# Patient Record
Sex: MALE | Race: WHITE | NOT HISPANIC OR LATINO | Employment: FULL TIME | URBAN - METROPOLITAN AREA
[De-identification: names, ages, dates, MRNs, and addresses within clinical notes are randomized per-mention and may not be internally consistent; named-entity substitution may affect disease eponyms.]

---

## 2018-08-31 ENCOUNTER — RECORDS - HEALTHEAST (OUTPATIENT)
Dept: LAB | Facility: CLINIC | Age: 53
End: 2018-08-31

## 2018-08-31 LAB
ALBUMIN SERPL-MCNC: 4.2 G/DL (ref 3.5–5)
ALP SERPL-CCNC: 94 U/L (ref 45–120)
ALT SERPL W P-5'-P-CCNC: 28 U/L (ref 0–45)
ANION GAP SERPL CALCULATED.3IONS-SCNC: 8 MMOL/L (ref 5–18)
AST SERPL W P-5'-P-CCNC: 18 U/L (ref 0–40)
BILIRUB SERPL-MCNC: 0.8 MG/DL (ref 0–1)
BUN SERPL-MCNC: 15 MG/DL (ref 8–22)
CALCIUM SERPL-MCNC: 9.5 MG/DL (ref 8.5–10.5)
CHLORIDE BLD-SCNC: 107 MMOL/L (ref 98–107)
CO2 SERPL-SCNC: 26 MMOL/L (ref 22–31)
CREAT SERPL-MCNC: 1.07 MG/DL (ref 0.7–1.3)
GFR SERPL CREATININE-BSD FRML MDRD: >60 ML/MIN/1.73M2
GLUCOSE BLD-MCNC: 103 MG/DL (ref 70–125)
LITHIUM SERPL-SCNC: 0.5 MMOL/L (ref 0.6–1.2)
POTASSIUM BLD-SCNC: 4.4 MMOL/L (ref 3.5–5)
PROT SERPL-MCNC: 7 G/DL (ref 6–8)
SODIUM SERPL-SCNC: 141 MMOL/L (ref 136–145)
TSH SERPL DL<=0.005 MIU/L-ACNC: 1.89 UIU/ML (ref 0.3–5)

## 2019-06-21 ENCOUNTER — RESULTS ONLY (OUTPATIENT)
Dept: LAB | Age: 54
End: 2019-06-21

## 2019-06-24 LAB
APTT PPP: 30 SEC (ref 22–37)
INR PPP: 1.11 (ref 0.86–1.14)
PLATELET # BLD AUTO: 211 10E9/L (ref 150–450)

## 2023-04-14 ENCOUNTER — ANCILLARY PROCEDURE (OUTPATIENT)
Dept: GENERAL RADIOLOGY | Facility: CLINIC | Age: 58
End: 2023-04-14
Attending: FAMILY MEDICINE
Payer: COMMERCIAL

## 2023-04-14 ENCOUNTER — OFFICE VISIT (OUTPATIENT)
Dept: FAMILY MEDICINE | Facility: CLINIC | Age: 58
End: 2023-04-14
Payer: COMMERCIAL

## 2023-04-14 VITALS
HEART RATE: 104 BPM | SYSTOLIC BLOOD PRESSURE: 153 MMHG | TEMPERATURE: 98.6 F | RESPIRATION RATE: 18 BRPM | OXYGEN SATURATION: 97 % | WEIGHT: 198.6 LBS | DIASTOLIC BLOOD PRESSURE: 96 MMHG

## 2023-04-14 DIAGNOSIS — R05.1 ACUTE COUGH: ICD-10-CM

## 2023-04-14 DIAGNOSIS — J18.9 PNEUMONIA OF RIGHT MIDDLE LOBE DUE TO INFECTIOUS ORGANISM: Primary | ICD-10-CM

## 2023-04-14 DIAGNOSIS — R06.2 WHEEZING: ICD-10-CM

## 2023-04-14 PROCEDURE — 94640 AIRWAY INHALATION TREATMENT: CPT | Performed by: FAMILY MEDICINE

## 2023-04-14 PROCEDURE — 71046 X-RAY EXAM CHEST 2 VIEWS: CPT | Mod: TC | Performed by: RADIOLOGY

## 2023-04-14 PROCEDURE — 99204 OFFICE O/P NEW MOD 45 MIN: CPT | Mod: 25 | Performed by: FAMILY MEDICINE

## 2023-04-14 RX ORDER — CODEINE PHOSPHATE AND GUAIFENESIN 10; 100 MG/5ML; MG/5ML
1-2 SOLUTION ORAL EVERY 6 HOURS PRN
Qty: 120 ML | Refills: 0 | Status: SHIPPED | OUTPATIENT
Start: 2023-04-14

## 2023-04-14 RX ORDER — PREDNISONE 20 MG/1
TABLET ORAL
Qty: 15 TABLET | Refills: 0 | Status: SHIPPED | OUTPATIENT
Start: 2023-04-14

## 2023-04-14 RX ORDER — LAMOTRIGINE 100 MG/1
TABLET ORAL
COMMUNITY
Start: 2023-02-11

## 2023-04-14 RX ORDER — AZITHROMYCIN 250 MG/1
TABLET, FILM COATED ORAL
Qty: 6 TABLET | Refills: 0 | Status: SHIPPED | OUTPATIENT
Start: 2023-04-14 | End: 2023-04-19

## 2023-04-14 RX ORDER — ALBUTEROL SULFATE 90 UG/1
2 AEROSOL, METERED RESPIRATORY (INHALATION) EVERY 6 HOURS PRN
Qty: 18 G | Refills: 0 | Status: SHIPPED | OUTPATIENT
Start: 2023-04-14

## 2023-04-14 RX ORDER — ALBUTEROL SULFATE 0.83 MG/ML
2.5 SOLUTION RESPIRATORY (INHALATION) ONCE
Status: COMPLETED | OUTPATIENT
Start: 2023-04-14 | End: 2023-04-14

## 2023-04-14 RX ADMIN — ALBUTEROL SULFATE 2.5 MG: 0.83 SOLUTION RESPIRATORY (INHALATION) at 20:12

## 2023-04-15 NOTE — PROGRESS NOTES
Assessment/Plan:   Acute cough  Wheezing  Pneumonia of right middle lobe due to infectious organism  Persistent cough for 3-4 weeks following Covid-19 infection. Worse for 4-5 days, productive vigorous cough with wheeze, chest tightness and shortness of breath. Today felt feverish. Continuous cough on exam with wheeze and rales.   Albuterol neb was given in the office. Repeat auscultation afterwards demonstrated improved air movement, no wheezes and able to take full big breaths.   CXR obtained and viewed by me showed infiltrates on the right side. Suspect pneumonia.   We will treat with dual antibiotics azithromycin and augmentin.   Albuterol inhaler with spacer prescribed 2 puffs every 4 hours as needed for wheeze, shortness of breath and cough.   If not improving may start the prednisone   Robitussin with codeine for severe cough at night.   Recheck if not improving in a couple days, sooner if worse.    - XR Chest 2 Views; Future  - albuterol (PROVENTIL) neb solution 2.5 mg - administered in the office  - guaiFENesin-codeine (ROBITUSSIN AC) 100-10 MG/5ML solution; Take 5-10 mLs by mouth every 6 hours as needed for cough  Dispense: 120 mL; Refill: 0  - Miscellaneous Order for DME - ONLY FOR DME  - albuterol (PROAIR HFA/PROVENTIL HFA/VENTOLIN HFA) 108 (90 Base) MCG/ACT inhaler; Inhale 2 puffs into the lungs every 6 hours as needed for shortness of breath, wheezing or cough  Dispense: 18 g; Refill: 0  - predniSONE (DELTASONE) 20 MG tablet; 40mg daily for 5 days then 20mg daily for 5 days  Dispense: 15 tablet; Refill: 0  - azithromycin (ZITHROMAX) 250 MG tablet; Take 2 tablets (500 mg) by mouth daily for 1 day, THEN 1 tablet (250 mg) daily for 4 days.  Dispense: 6 tablet; Refill: 0  - amoxicillin-clavulanate (AUGMENTIN) 875-125 MG tablet; Take 1 tablet by mouth 2 times daily for 10 days  Dispense: 20 tablet; Refill: 0    I discussed red flag symptoms, return precautions to clinic/ER and follow up care with  patient/parent.  Expected clinical course, symptomatic cares advised. Questions answered. Patient/parent amenable with plan.    Augmentin twice a day for 10 days  Take with food  Yogurt or probiotics    Azithromycin as directed    Albuterol inhaler with spacer, 2 puffs every 4 hours as needed for wheeze or shortness of breath.     Prednisone - start if persistent cough or wheeze    Codeine cough syrup at bedtime (and every 6 hours) if needed for severe cough.     Recheck if worse or no better.     Subjective:     Ferdinand Lynn is a 57 year old male who presents for evaluation of persistent and worsening cough. He had Covid in mid-March, 3/17/23. Symptoms included nasal congestion, fatigue and cough. He felt better after a few days except for the cough which has been persistent.   The last 4-5 days the cough has been worse - productive and more vigorous, continuous.   This week at work as a  he could hardly talk with patients due to coughing.   He has had some chest tightness and shortness of breath.  Today he felt feverish.   No chest pain except substernal. No leg swelling or calf tenderness.   No N/V/D, no rash, no ear pain or ST or sinus pressure.   He had childhood asthma which he outgrew.   Nonsmoker     No Known Allergies     Current Outpatient Medications   Medication     albuterol (PROAIR HFA/PROVENTIL HFA/VENTOLIN HFA) 108 (90 Base) MCG/ACT inhaler     amoxicillin-clavulanate (AUGMENTIN) 875-125 MG tablet     azithromycin (ZITHROMAX) 250 MG tablet     guaiFENesin-codeine (ROBITUSSIN AC) 100-10 MG/5ML solution     lamoTRIgine (LAMICTAL) 100 MG tablet     predniSONE (DELTASONE) 20 MG tablet     No current facility-administered medications for this visit.     Patient Active Problem List   Diagnosis   (none) - all problems resolved or deleted       Objective:     BP (!) 153/96   Pulse 104   Temp 98.6  F (37  C) (Oral)   Resp 18   Wt 90.1 kg (198 lb 9.6 oz)   SpO2 97%      Physical    General Appearance: Alert, pleasant, no distress but low energy and mildly ill appearing, aVSS. Frequent coughing, BP and pulse slightly elevated.   Head: Normocephalic, without obvious abnormality, atraumatic  Eyes: Conjunctivae are normal.   Ears: Normal TMs and external ear canals, both ears  Nose: mild congestion.  Throat: Throat is normal.  No exudate.  No vesicular lesions  Neck: No adenopathy  Lungs: very tight chest with shallow breaths to avoid cough, expiratory wheezes, few crackles both sides, respirations unlabored  Heart: Regular rate and rhythm  Extremities: No lower extremity edema  Skin: Skin color, texture, turgor normal, no rashes or lesions  Psychiatric: Patient has a normal mood and affect.         Results for orders placed or performed in visit on 04/14/23   XR Chest 2 Views     Status: None    Narrative    EXAM: XR CHEST 2 VIEWS  LOCATION: [facility]  DATE/TIME: 4/14/2023 8:29 PM CDT    INDICATION: worsening cough and wheeze after covid infection 4 weeks ago  COMPARISON: None.      Impression    IMPRESSION: There is localized infiltrate seen in the right midlung worrisome for pneumonitis given patient's history of cough. There is mild increased perihilar lung markings most typical for prior bronchial inflammation. Prominent nipple shadows.       This note has been dictated in part using voice recognition software.  Any grammatical or context distortions are unintentional and inherent to the software.  Please feel free to contact me directly for clarification if needed.

## 2023-04-15 NOTE — PATIENT INSTRUCTIONS
Augmentin twice a day for 10 days  Take with food  Yogurt or probiotics    Azithromycin as directed    Albuterol inhaler with spacer, 2 puffs every 4 hours as needed for wheeze or shortness of breath.     Prednisone - start if persistent cough or wheeze    Codeine cough syrup at bedtime (and every 6 hours) if needed for severe cough.     Recheck if worse or no better.

## 2023-06-01 ENCOUNTER — HEALTH MAINTENANCE LETTER (OUTPATIENT)
Age: 58
End: 2023-06-01

## 2024-04-13 ENCOUNTER — ANCILLARY PROCEDURE (OUTPATIENT)
Dept: GENERAL RADIOLOGY | Facility: CLINIC | Age: 59
End: 2024-04-13
Attending: FAMILY MEDICINE
Payer: COMMERCIAL

## 2024-04-13 ENCOUNTER — OFFICE VISIT (OUTPATIENT)
Dept: URGENT CARE | Facility: URGENT CARE | Age: 59
End: 2024-04-13
Payer: COMMERCIAL

## 2024-04-13 VITALS
RESPIRATION RATE: 19 BRPM | SYSTOLIC BLOOD PRESSURE: 112 MMHG | HEART RATE: 92 BPM | WEIGHT: 200 LBS | TEMPERATURE: 97.2 F | OXYGEN SATURATION: 95 % | DIASTOLIC BLOOD PRESSURE: 68 MMHG

## 2024-04-13 DIAGNOSIS — J06.9 VIRAL URI WITH COUGH: Primary | ICD-10-CM

## 2024-04-13 LAB
FLUAV AG SPEC QL IA: NEGATIVE
FLUBV AG SPEC QL IA: NEGATIVE

## 2024-04-13 PROCEDURE — 71046 X-RAY EXAM CHEST 2 VIEWS: CPT | Mod: TC | Performed by: RADIOLOGY

## 2024-04-13 PROCEDURE — 99213 OFFICE O/P EST LOW 20 MIN: CPT | Performed by: FAMILY MEDICINE

## 2024-04-13 PROCEDURE — 87804 INFLUENZA ASSAY W/OPTIC: CPT | Performed by: FAMILY MEDICINE

## 2024-04-13 ASSESSMENT — PAIN SCALES - GENERAL: PAINLEVEL: NO PAIN (0)

## 2024-04-13 NOTE — PROGRESS NOTES
Subjective: Patient had pneumonia a year ago after COVID.  This child got a cold and then 5 days ago he caught the cold, might of had a fever at the beginning along with the cough and some chills but today the cough got a lot worse and breathing is a little harder.  There is nasal congestion.  He also for couple of weeks before getting this cold out a little persistent left-sided chest pain although it would come and go unrelated to activity or breathing or movement and it would last a day or 2 and then go away for a while and then come back and now since he gets a cold it is gone.  Definitely not exertion related.  Objective: ENT is nonspecific.  Neck is normal.  Lungs with bibasilar crackles.  Heart is regular without murmurs.  Oxygenation is 95% on room air.  Chest x-ray was done.  I would consider it normal.  We are getting a stat reading.    Assessment and plan: Viral URI.  Seems a little worse today but may be just because he was a lot more active.  He will do another COVID test, did 1 at home earlier, just to be sure.  I will let him know if the radiologist sees anything different.

## 2024-06-09 ENCOUNTER — HEALTH MAINTENANCE LETTER (OUTPATIENT)
Age: 59
End: 2024-06-09

## 2024-11-05 ENCOUNTER — OFFICE VISIT (OUTPATIENT)
Dept: URGENT CARE | Facility: URGENT CARE | Age: 59
End: 2024-11-05
Payer: COMMERCIAL

## 2024-11-05 VITALS
RESPIRATION RATE: 18 BRPM | TEMPERATURE: 97.6 F | OXYGEN SATURATION: 97 % | DIASTOLIC BLOOD PRESSURE: 83 MMHG | HEART RATE: 67 BPM | SYSTOLIC BLOOD PRESSURE: 157 MMHG

## 2024-11-05 DIAGNOSIS — M54.50 LUMBOSACRAL PAIN: Primary | ICD-10-CM

## 2024-11-05 PROCEDURE — 99213 OFFICE O/P EST LOW 20 MIN: CPT | Mod: 25 | Performed by: PHYSICIAN ASSISTANT

## 2024-11-05 PROCEDURE — 96372 THER/PROPH/DIAG INJ SC/IM: CPT | Performed by: PHYSICIAN ASSISTANT

## 2024-11-05 RX ORDER — MELOXICAM 15 MG/1
15 TABLET ORAL DAILY
Qty: 14 TABLET | Refills: 0 | Status: SHIPPED | OUTPATIENT
Start: 2024-11-05 | End: 2024-11-19

## 2024-11-05 RX ORDER — KETOROLAC TROMETHAMINE 30 MG/ML
15 INJECTION, SOLUTION INTRAMUSCULAR; INTRAVENOUS ONCE
Status: COMPLETED | OUTPATIENT
Start: 2024-11-05 | End: 2024-11-05

## 2024-11-05 RX ORDER — CYCLOBENZAPRINE HCL 10 MG
10 TABLET ORAL
Qty: 14 TABLET | Refills: 0 | Status: SHIPPED | OUTPATIENT
Start: 2024-11-05 | End: 2024-11-19

## 2024-11-05 RX ADMIN — KETOROLAC TROMETHAMINE 15 MG: 30 INJECTION, SOLUTION INTRAMUSCULAR; INTRAVENOUS at 14:00

## 2024-11-05 NOTE — PATIENT INSTRUCTIONS
Your back pain from left sciatica.     I have prescribed you a short course of antiinflammatory to help decrease the inflammation in the area of the spasm. This should help to decrease associated numbness/tingling (nerve pain). Please take as directed.    Do not take OTC ibuprofen or NSAIDs while on the Mobic. If having pain, take Tylenol up to 1,000mg, 4 times daily.    You may use the Flexeril as needed for muscle spasm. Use caution while taking this medication, as it can make you drowsy. Do not take while driving, operating heavy machinery, or doing any activities requiring intense concentration.    Try using a heating pad and/or warm baths.    Make sure to keep moving to avoid getting stiff. See below for stretching exercises.    If you develop fever, severe pain that prevents you from walking at all, weakness of your arms or legs, loss of bowel or bladder continence, or any other new concerning symptoms, go to the ER immediately.    Otherwise, follow up with primary care doctor as needed or if no improvement in pain in symptoms in 1 week.

## 2024-11-05 NOTE — PROGRESS NOTES
Patient presents with:  Back Pain: Has lower back pain since yesterday has hx of back pain      Clinical Decision Making:  Patient is having difficulty with right sided lumbar sacral pain.  He is given Toradol 15 mg in the office single dose for anti-inflammatory effect.  Will transition to Mobic once daily for analgesia and anti-inflammatory.  Use of cyclobenzaprine at nighttime for sleep and comfort.  Patient is cautioned regarding impairment and sedation. Expected course of resolution and indication for return was gone over and questions were answered to patient/parent's satisfaction before discharge.        ICD-10-CM    1. Lumbosacral pain  M54.50 ketorolac (TORADOL) injection 15 mg     meloxicam (MOBIC) 15 MG tablet     cyclobenzaprine (FLEXERIL) 10 MG tablet          Patient Instructions   Your back pain from left sciatica.     I have prescribed you a short course of antiinflammatory to help decrease the inflammation in the area of the spasm. This should help to decrease associated numbness/tingling (nerve pain). Please take as directed.    Do not take OTC ibuprofen or NSAIDs while on the Mobic. If having pain, take Tylenol up to 1,000mg, 4 times daily.    You may use the Flexeril as needed for muscle spasm. Use caution while taking this medication, as it can make you drowsy. Do not take while driving, operating heavy machinery, or doing any activities requiring intense concentration.    Try using a heating pad and/or warm baths.    Make sure to keep moving to avoid getting stiff. See below for stretching exercises.    If you develop fever, severe pain that prevents you from walking at all, weakness of your arms or legs, loss of bowel or bladder continence, or any other new concerning symptoms, go to the ER immediately.    Otherwise, follow up with primary care doctor as needed or if no improvement in pain in symptoms in 1 week.            HPI:  Ferdinand Lynn is a 59 year old male who presents today  for a one day acute onset of right sided lower lumbar sacral pain at the right SI joint.  Patient states the pain is a 9 out of 10.  It radiates to the buttock but does not go any farther into the thigh or leg.  Does not have any lower extremity weakness or paresthesias.  No reported fever fecal or urinary incontinence.  Has not tried treatment for this at home.  Has not had prior history of surgeries, fractures but has had self-limited low back pain in the past.    History obtained from chart review and the patient.    Problem List:  2006-04: iamJOINT PAIN-ANKLE      No past medical history on file.    Social History     Tobacco Use    Smoking status: Never    Smokeless tobacco: Never   Substance Use Topics    Alcohol use: Not on file       Review of Systems  As above in HPI otherwise negative.    Vitals:    11/05/24 1327   BP: (!) 157/83   Pulse: 67   Resp: 18   Temp: 97.6  F (36.4  C)   TempSrc: Tympanic   SpO2: 97%     General: Patient is slightly uncomfortable in the office encounter secondary to pain.  Patient ambulates into the office encounter is able to walk into the office encounter sit in the chair stand up and walk to the examination table.    Musculoskeletal:  No noted deformities and the alignment is midline.    Palpation: No cervical thoracic lumbar sacral spinous process tenderness to palpation  Paraspinal muscles are palpated.    There is slight paraspinal muscle tenderness on the right side.  SI joint on the right side is tender to palpation and does reproduce symptoms radiates into the buttock  but no farther into the lower leg.    Range of motion: She has extension to 20 degrees and flexion to 90 degrees although touching toes with flexion is painful for the patient and does reproduce symptoms    Reflexes: DTRs are 2 out of 4 and equal bilaterally at the Achilles and patella.    Musculoskeletal strength: 5 out of 5 and equal bilaterally.  Straight leg raises reproduce pain on the right  side  Seated straight leg raises produce a flip sign on the left and right side  Patient is able to arise on the heels with pain in the right SI joint and buttock but balls of the feet are non-painful.    Physical Exam    At the end of the encounter, I discussed results, diagnosis, medications. Discussed red flags for immediate return to clinic/ER, as well as indications for follow up if no improvement. Patient understood and agreed to plan. Patient was stable for discharge.

## 2024-11-18 ENCOUNTER — OFFICE VISIT (OUTPATIENT)
Dept: URGENT CARE | Facility: URGENT CARE | Age: 59
End: 2024-11-18
Payer: COMMERCIAL

## 2024-11-18 VITALS
DIASTOLIC BLOOD PRESSURE: 84 MMHG | SYSTOLIC BLOOD PRESSURE: 158 MMHG | OXYGEN SATURATION: 94 % | HEART RATE: 82 BPM | TEMPERATURE: 98.2 F | RESPIRATION RATE: 18 BRPM

## 2024-11-18 DIAGNOSIS — R07.0 THROAT PAIN: Primary | ICD-10-CM

## 2024-11-18 LAB
DEPRECATED S PYO AG THROAT QL EIA: NEGATIVE
GROUP A STREP BY PCR: NOT DETECTED

## 2024-11-18 PROCEDURE — 99213 OFFICE O/P EST LOW 20 MIN: CPT | Performed by: PHYSICIAN ASSISTANT

## 2024-11-18 PROCEDURE — 87651 STREP A DNA AMP PROBE: CPT | Performed by: PHYSICIAN ASSISTANT

## 2024-11-18 PROCEDURE — 87635 SARS-COV-2 COVID-19 AMP PRB: CPT | Performed by: PHYSICIAN ASSISTANT

## 2024-11-18 ASSESSMENT — ENCOUNTER SYMPTOMS
ABDOMINAL PAIN: 0
FEVER: 0
SORE THROAT: 1
FATIGUE: 1
NAUSEA: 0
VOMITING: 0
COUGH: 1

## 2024-11-18 NOTE — PATIENT INSTRUCTIONS
Rapid strep test was negative today.  We will be doing a confirmatory strep test that takes between 12 and 24 hours to come back.  We only call you with this test result if it is positive.  It will be visible in MyChart.  Your COVID test will also be visible in MyChart in 12 to 24 hours.  At this time your lungs are sounding clear, but if you are not having any improvement in your cough over the next 3 days or if you are developing fevers please follow up for chest x-ray due to high rates of mycoplasma pneumonia in our community at this time.  For right now I recommend alternating between DayQuil and NyQuil.  You may also take ibuprofen and Tylenol(Acetaminophen) for pain management.  There is acetaminophen in Dayquil and NyQuil, so if you are taking both make sure that you are not exceeding 4000 mg of Acetaminophen in a 24 hour period.   Drink plenty of fluids and rest your voice and body.  Lozenges and salt water gargles may be helpful for both your throat pain and cough relief as well.  A teaspoon of honey is a natural cough suppressant also.

## 2024-11-18 NOTE — PROGRESS NOTES
Patient presents with:  Pharyngitis: Sore throat cough and fatigue for about 2 days       Clinical Decision Making:  Sick symptoms for the past 2 days.  Rapid strep test negative.  Physical exam generally benign.  Lungs are currently CTA, but pneumonia does remain on differential due to high rates in the community.  COVID test in process.  Recommend supportive cares for now.  Patient is agreeable to this plan.      ICD-10-CM    1. Throat pain  R07.0 Streptococcus A Rapid Screen w/Reflex to PCR - Clinic Collect     Group A Streptococcus PCR Throat Swab     COVID-19 Virus (Coronavirus) by PCR Nose          Patient Instructions   Rapid strep test was negative today.  We will be doing a confirmatory strep test that takes between 12 and 24 hours to come back.  We only call you with this test result if it is positive.  It will be visible in MyChart.  Your COVID test will also be visible in MyChart in 12 to 24 hours.  At this time your lungs are sounding clear, but if you are not having any improvement in your cough over the next 3 days or if you are developing fevers that like to have you following up for chest x-ray due to high rates of mycoplasma pneumonia in our community at this time.  For right now I recommend alternating between DayQuil and NyQuil.  You may also take ibuprofen and Tylenol(Acetaminophen) for pain management.  There is acetaminophen in day and NyQuil, so if you are taking both make sure that you are not exceeding 4000 mg of acetaminophen in a 24 hour period.   Drink plenty of fluids and rest your voice and body.  Lozenges and salt water gargles may be helpful for both your throat pain and cough relief as well.  A teaspoon of honey is a natural cough suppressant also.    HPI:  Ferdinand Lynn is a 59 year old male who presents today with concerns of ST and fatigue x 2 days. NO KNOWN SICK CONTACTS. Patient had been seen in the ED last week for his back and was started of Prednisone burst. He  has taken Nyquil and Dayquil. Patient works as a  at [employer]. No at home COVID test.     History obtained from the patient.    Problem List:  2006-04: iamJOINT PAIN-ANKLE      No past medical history on file.    Social History     Tobacco Use    Smoking status: Never    Smokeless tobacco: Never   Substance Use Topics    Alcohol use: Not on file         Review of Systems   Constitutional:  Positive for fatigue. Negative for fever.   HENT:  Positive for congestion and sore throat. Negative for ear pain.    Respiratory:  Positive for cough.    Gastrointestinal:  Negative for abdominal pain, nausea and vomiting.       Vitals:    11/18/24 1734   BP: (!) 158/84   Pulse: 82   Resp: 18   Temp: 98.2  F (36.8  C)   TempSrc: Oral   SpO2: 94%       Physical Exam  Vitals and nursing note reviewed.   Constitutional:       General: He is not in acute distress.     Appearance: He is not toxic-appearing or diaphoretic.   HENT:      Head: Normocephalic and atraumatic.      Right Ear: Tympanic membrane, ear canal and external ear normal.      Left Ear: Tympanic membrane, ear canal and external ear normal.      Mouth/Throat:      Mouth: Mucous membranes are moist.      Pharynx: Uvula midline. Posterior oropharyngeal erythema and uvula swelling (mild) present. No oropharyngeal exudate.   Eyes:      Conjunctiva/sclera: Conjunctivae normal.   Cardiovascular:      Rate and Rhythm: Normal rate and regular rhythm.      Heart sounds: No murmur heard.  Pulmonary:      Effort: Pulmonary effort is normal. No respiratory distress.      Breath sounds: Normal breath sounds. No stridor. No wheezing, rhonchi or rales.   Neurological:      Mental Status: He is alert.   Psychiatric:         Mood and Affect: Mood normal.         Behavior: Behavior normal.         Thought Content: Thought content normal.         Judgment: Judgment normal.       Results:  Results for orders placed or performed in visit on 11/18/24   Streptococcus A Rapid  Screen w/Reflex to PCR - Clinic Collect     Status: Normal    Specimen: Throat; Swab   Result Value Ref Range    Group A Strep antigen Negative Negative         At the end of the encounter, I discussed results, diagnosis, medications. Discussed red flags for immediate return to clinic/ER, as well as indications for follow up if no improvement. Patient understood and agreed to plan. Patient was stable for discharge.

## 2024-11-19 LAB — SARS-COV-2 RNA RESP QL NAA+PROBE: NEGATIVE

## 2025-02-28 ENCOUNTER — LAB REQUISITION (OUTPATIENT)
Dept: LAB | Facility: CLINIC | Age: 60
End: 2025-02-28

## 2025-02-28 DIAGNOSIS — Z13.220 ENCOUNTER FOR SCREENING FOR LIPOID DISORDERS: ICD-10-CM

## 2025-02-28 PROCEDURE — 80061 LIPID PANEL: CPT | Performed by: FAMILY MEDICINE

## 2025-03-01 LAB
CHOLEST SERPL-MCNC: 264 MG/DL
FASTING STATUS PATIENT QL REPORTED: NO
HDLC SERPL-MCNC: 33 MG/DL
LDLC SERPL CALC-MCNC: 153 MG/DL
NONHDLC SERPL-MCNC: 231 MG/DL
TRIGL SERPL-MCNC: 389 MG/DL

## 2025-06-15 ENCOUNTER — HEALTH MAINTENANCE LETTER (OUTPATIENT)
Age: 60
End: 2025-06-15